# Patient Record
Sex: FEMALE | Race: WHITE
[De-identification: names, ages, dates, MRNs, and addresses within clinical notes are randomized per-mention and may not be internally consistent; named-entity substitution may affect disease eponyms.]

---

## 2018-01-31 ENCOUNTER — HOSPITAL ENCOUNTER (EMERGENCY)
Dept: HOSPITAL 62 - ER | Age: 23
Discharge: HOME | End: 2018-01-31
Payer: SELF-PAY

## 2018-01-31 VITALS — DIASTOLIC BLOOD PRESSURE: 58 MMHG | SYSTOLIC BLOOD PRESSURE: 101 MMHG

## 2018-01-31 DIAGNOSIS — J06.9: Primary | ICD-10-CM

## 2018-01-31 DIAGNOSIS — J02.9: ICD-10-CM

## 2018-01-31 DIAGNOSIS — M79.1: ICD-10-CM

## 2018-01-31 DIAGNOSIS — R05: ICD-10-CM

## 2018-01-31 DIAGNOSIS — R50.9: ICD-10-CM

## 2018-01-31 DIAGNOSIS — R09.81: ICD-10-CM

## 2018-01-31 PROCEDURE — 99283 EMERGENCY DEPT VISIT LOW MDM: CPT

## 2018-01-31 NOTE — ER DOCUMENT REPORT
HPI





- HPI


Patient complains to provider of: Cough congestion sore throat body aches


Onset: Last week


Quality of pain: Achy


Severity: Mild


Pain Level: 2


Associated Symptoms: Body/muscle aches, Nonproductive cough, Fever - Fever and 

least the last 4 days, Rhinnorhea, Sinus pain/drainage, Sore throat


Exacerbated by: Denies


Relieved by: Denies


Similar symptoms previously: Yes


Recently seen / treated by doctor: No





- ROS


ROS below otherwise negative: Yes





- CONSTITUTIONAL


Constitutional: REPORTS: Fever, Chills





- EENT


EENT: REPORTS: Sore Throat.  DENIES: Ear Pain, Eye problems





- NEURO


Neurology: REPORTS: Headache





- CARDIOVASCULAR


Cardiovascular: DENIES: Chest pain





- RESPIRATORY


Respiratory: REPORTS: Coughing.  DENIES: Trouble Breathing





- GASTROINTESTINAL


Gastrointestinal: DENIES: Abdominal Pain, Black / Bloody Stools





- URINARY


Urinary: DENIES: Dysuria, Urgency, Frequency





- REPRODUCTIVE


Reproductive: DENIES: Pregnant:, Postmenopausal, Abnormal bleeding / discharge





- MUSCULOSKELETAL


Musculoskeletal: DENIES: Extremity pain, Back Pain, Neck Pain, Swelling


Notes: 





Body aches





- DERM


Skin Color: Normal


Skin Problems: None





Past Medical History





- General


Information source: Patient





- Social History


Smoking Status: Current Every Day Smoker


Cigarette use (# per day): Yes - Cigarettes a day


Chew tobacco use (# tins/day): No


Smoking Education Provided: Yes - Performance


Frequency of alcohol use: None


Drug Abuse: None


Occupation: [employer]


Lives with: Family


Family History: Arthritis, Malignancy.  denies: CAD, COPD, CVA, DM, 

Hyperlipidemia, Hypertension, Thyroid Disfunction


Patient has suicidal ideation: No


Patient has homicidal ideation: No





- Past Medical History


Cardiac Medical History: Reports: None


Pulmonary Medical History: Reports: None


EENT Medical History: Reports: None


Neurological Medical History: Reports: None


Endocrine Medical History: Reports: None


Renal/ Medical History: Reports: None


Malignancy Medical History: Reports: None


GI Medical History: Reports: None


Musculoskeltal Medical History: Reports None


Skin Medical History: Reports None


Psychiatric Medical History: Reports: None


Traumatic Medical History: Reports: None


Infectious Medical History: Reports: None


Past Surgical History: Reports: Hx  Section





- Immunizations


Immunizations up to date: Yes





Vertical Provider Document





- CONSTITUTIONAL


Agree With Documented VS: Yes





- INFECTION CONTROL


TRAVEL OUTSIDE OF THE [place] IN LAST 30 DAYS: No





- HEENT


HEENT: Atraumatic, Normocephalic, PERRLA.  negative: Normal ENT Exam - Purulent 

nasal drainage, post nasal drip, no redness or swelling to tonsils.





- RESPIRATORY


Respiratory: Breath Sounds Normal, No Respiratory Distress, Chest Non-Tender.  

negative: Rales, Rhonchi, Wheezing


O2 Sat by Pulse Oximetry: 96





- CARDIOVASCULAR


Cardiovascular: Regular Rate, Regular Rhythm





- GI/ABDOMEN


Gastrointestinal: Abdomen Soft, No Organomegaly, Normal Bowel Sounds





- BACK


Back: Normal Inspection





- MUSCULOSKELETAL/EXTREMETIES


Musculoskeletal/Extremeties: Tender - Body aches





- NEURO


Level of Consciousness: Awake, Alert, Appropriate





- DERM


Integumentary: Warm, Dry, No Rash





Course





- Re-evaluation


Re-evalutation: 





18 15:27


Patient treated with Claritin and Sudafed Mucinex and Tylenol.  Patient has 

signs and symptoms of right upper respiratory infection.  She has been afebrile 

for 4 days.  Patient to follow-up with her primary doctor.





- Vital Signs


Vital signs: 


 











Temp Pulse Resp BP Pulse Ox


 


 98.7 F   73   16   101/58 L  96 


 


 18 14:45  18 14:45  18 14:45  18 14:45  18 14:45














Discharge





- Discharge


Clinical Impression: 


Upper respiratory infection


Qualifiers:


 URI type: unspecified URI Qualified Code(s): J06.9 - Acute upper respiratory 

infection, unspecified





Condition: Stable


Disposition: HOME, SELF-CARE


Instructions:  Family Physicians / Practices


Additional Instructions: 


UPPER RESPIRATORY ILLNESS:





     You have a viral infection of the respiratory passages -- a "cold."  This 

common infection causes nasal congestion, drainage, and often sore throat and 

cough.  It is highly contagious.  The disease usually lasts about 10 to 14 days.


     There is no "cure" for the viral infection -- it must run its course.  If 

there is a complication, such as bacterial infection in the nose, sinuses, 

middle ear, or bronchial tubes, antibiotics may be required.  The antibiotics 

won't affect the virus.


     Drink plenty of fluids.  A humidifier may help.  An expectorant medication 

or decongestant may make you more comfortable.  Use acetaminophen or ibuprofen 

for fever or aches.


     See the doctor if fever persists over two days, if there is any 

significant worsening of your symptoms, or if you simply fail to improve as 

expected.


You will treated with Claritin 10 mg Sudafed 30 mg Mucinex 600 mg and Tylenol 

650 mg for your cough cold and congestion.  Please follow-up with your primary 

doctor if these do not help you.  The same medications can be bought over-the-

counter.  Flonase is a nasal spray that can also be bought over-the-counter it 

may help you with your symptoms also.





DECONGESTANT MEDICATION:


     A decongestant medicine has been suggested.  Often this medicine is 

combined in the same tablet with an antihistamine or expectorant. This type of 

medicine is helpful in treating a bad cold or sinus condition, as well as in 

treatment of the nasal congestion of hay fever.  It is not of much benefit for 

lung infections.


     Decongestant medicines are related to stimulants.  They can cause an 

increase in blood pressure and heart rate.  Persons with heart disease and high 

blood pressure should not take decongestants without discussing this with the 

physician.


     If you develop palpitations, chest pain, headache, or tremors, stop the 

medicine and consult your physician.








COUGH-SUPPRESSANT & EXPECTORANT MEDICATION:


     You are to use a cough medication as needed for relief of symptoms.  This 

medicine is a combination of an expectorant (to make the mucous thinner and 

more easily "coughed up") and a cough suppressant (to reduce the frequency of 

coughing).


     The cough-suppressant medicine is related to narcotics.  You may 

experience mild nausea and sleepiness.  Some patients who are very sensitive to 

narcotics may have stomach pain from this medicine. Taking the medicine with 

food reduces these side effects.  Do not drive or work with machinery until you 

know how this medicine affects you.


     The expectorant should have no side effects.  Iodine-containing 

expectorants (such as organidin) should not be taken by persons with active 

thyroid disease unless approved by your doctor.


     Call the doctor if you develop shortness of breath, hives, rash, itching, 

lightheadedness, or severe nausea and vomiting.








USE OF ACETAMINOPHEN (Tylenol):


     Acetaminophen may be taken for pain relief or fever control. It's much 

safer than aspirin, offering a wider range of "safe" dosages.  It is safe 

during pregnancy.  Some brand names are Tylenol, Panadol, Datril, Anacin 3, 

Tempra, and Liquiprin. Acetaminophen can be repeated every four hours.  The 

following are maximum recommended dosages:


>89 pounds or adults          650 mg to 900 mg


Acetaminophen can be repeated every four hours.  Maximum dose not to exceed 

4000 mg a day.








SMOKING:


     If you smoke, you should stop smoking.  The tar and chemicals in cigarette 

smoke are harmful.  Smoking has been shown to cause:


          emphysema


          chronic bronchitis


          lung cancer


          mouth and throat cancer


          stomach and pancreas cancer


          premature aging


          birth defects


     In addition, smoking increases ear and lung infections in children of 

smokers.





Salt and soda solution


1 quart of water


1 tablespoon of salt


1 teaspoon of baking soda


Mixed 3 ingredients together and boil for 1 minute


Placed in a covered quart jar


Use 1/2 ounce of cold solution to gargle 3 times a day





FOLLOW-UP CARE:


If you have been referred to a physician for follow-up care, call the physician

s office for an appointment as you were instructed or within the next two days.

  If you experience worsening or a significant change in your symptoms, notify 

the physician immediately or return to the Emergency Department at any time for 

re-evaluation.





Forms:  Smoking Cessation Education, Return to Work